# Patient Record
Sex: FEMALE | Race: WHITE
[De-identification: names, ages, dates, MRNs, and addresses within clinical notes are randomized per-mention and may not be internally consistent; named-entity substitution may affect disease eponyms.]

---

## 2018-04-25 ENCOUNTER — HOSPITAL ENCOUNTER (OUTPATIENT)
Dept: HOSPITAL 33 - ED | Age: 83
Setting detail: OBSERVATION
LOS: 2 days | Discharge: HOME | End: 2018-04-27
Attending: FAMILY MEDICINE | Admitting: FAMILY MEDICINE
Payer: MEDICARE

## 2018-04-25 DIAGNOSIS — F02.81: ICD-10-CM

## 2018-04-25 DIAGNOSIS — K21.9: ICD-10-CM

## 2018-04-25 DIAGNOSIS — Z79.899: ICD-10-CM

## 2018-04-25 DIAGNOSIS — Z85.3: ICD-10-CM

## 2018-04-25 DIAGNOSIS — M19.90: ICD-10-CM

## 2018-04-25 DIAGNOSIS — G30.9: ICD-10-CM

## 2018-04-25 DIAGNOSIS — I10: ICD-10-CM

## 2018-04-25 DIAGNOSIS — F41.9: ICD-10-CM

## 2018-04-25 DIAGNOSIS — J18.1: Primary | ICD-10-CM

## 2018-04-25 LAB
ALBUMIN SERPL-MCNC: 4.2 G/DL (ref 3.5–5)
ALP SERPL-CCNC: 114 U/L (ref 38–126)
ALT SERPL-CCNC: 12 U/L (ref 0–35)
ANION GAP SERPL CALC-SCNC: 17.6 MEQ/L (ref 5–15)
AST SERPL QL: 22 U/L (ref 14–36)
BASOPHILS # BLD AUTO: 0.02 10*3/UL (ref 0–0.4)
BASOPHILS NFR BLD AUTO: 0.2 % (ref 0–0.4)
BILIRUB BLD-MCNC: 0.5 MG/DL (ref 0.2–1.3)
BUN SERPL-MCNC: 23 MG/DL (ref 7–17)
CALCIUM SPEC-MCNC: 9.4 MG/DL (ref 8.4–10.2)
CHLORIDE SERPL-SCNC: 101 MMOL/L (ref 98–107)
CO2 SERPL-SCNC: 21 MMOL/L (ref 22–30)
CREAT SERPL-MCNC: 1.14 MG/DL (ref 0.52–1.04)
EOSINOPHIL # BLD AUTO: 0 10*3/UL (ref 0–0.5)
FLUAV AG NPH QL IA: NEGATIVE
FLUBV AG NPH QL IA: NEGATIVE
GLUCOSE SERPL-MCNC: 142 MG/DL (ref 74–106)
GLUCOSE UR-MCNC: 50 MG/DL
GRANULOCYTES # BLD AUTO: 10.55 10*3/UL (ref 1.4–6.9)
HCT VFR BLD AUTO: 41.2 % (ref 35–47)
HGB BLD-MCNC: 13.6 GM/DL (ref 12–16)
LYMPHOCYTES # SPEC AUTO: 0.91 10*3/UL (ref 1–4.6)
MCH RBC QN AUTO: 29.1 PG (ref 26–32)
MCHC RBC AUTO-ENTMCNC: 33 G/DL (ref 32–36)
MONOCYTES # BLD AUTO: 0.67 10*3/UL (ref 0–1.3)
NEUTROPHILS NFR BLD AUTO: 86.8 % (ref 36–66)
PLATELET # BLD AUTO: 308 K/MM3 (ref 150–450)
POTASSIUM SERPLBLD-SCNC: 4.2 MMOL/L (ref 3.5–5.1)
PROT SERPL-MCNC: 7.6 G/DL (ref 6.3–8.2)
PROT UR STRIP-MCNC: (no result) MG/DL
RBC # BLD AUTO: 4.67 M/MM3 (ref 4.1–5.4)
RBC # URNS HPF: (no result) /HPF (ref 0–2)
RSV AG SPEC QL IA: NEGATIVE
SODIUM SERPL-SCNC: 135 MMOL/L (ref 137–145)
WBC # BLD AUTO: 12.2 K/MM3 (ref 4–10.5)
WBC URNS QL MICRO: (no result) /HPF (ref 0–5)

## 2018-04-25 PROCEDURE — 96360 HYDRATION IV INFUSION INIT: CPT

## 2018-04-25 PROCEDURE — 80048 BASIC METABOLIC PNL TOTAL CA: CPT

## 2018-04-25 PROCEDURE — 96361 HYDRATE IV INFUSION ADD-ON: CPT

## 2018-04-25 PROCEDURE — 96365 THER/PROPH/DIAG IV INF INIT: CPT

## 2018-04-25 PROCEDURE — 93005 ELECTROCARDIOGRAM TRACING: CPT

## 2018-04-25 PROCEDURE — 81000 URINALYSIS NONAUTO W/SCOPE: CPT

## 2018-04-25 PROCEDURE — 36000 PLACE NEEDLE IN VEIN: CPT

## 2018-04-25 PROCEDURE — 87631 RESP VIRUS 3-5 TARGETS: CPT

## 2018-04-25 PROCEDURE — 83605 ASSAY OF LACTIC ACID: CPT

## 2018-04-25 PROCEDURE — 36415 COLL VENOUS BLD VENIPUNCTURE: CPT

## 2018-04-25 PROCEDURE — 80053 COMPREHEN METABOLIC PANEL: CPT

## 2018-04-25 PROCEDURE — 85025 COMPLETE CBC W/AUTO DIFF WBC: CPT

## 2018-04-25 PROCEDURE — 99285 EMERGENCY DEPT VISIT HI MDM: CPT

## 2018-04-25 PROCEDURE — 71046 X-RAY EXAM CHEST 2 VIEWS: CPT

## 2018-04-25 PROCEDURE — G0378 HOSPITAL OBSERVATION PER HR: HCPCS

## 2018-04-25 PROCEDURE — 87086 URINE CULTURE/COLONY COUNT: CPT

## 2018-04-25 PROCEDURE — 96367 TX/PROPH/DG ADDL SEQ IV INF: CPT

## 2018-04-25 PROCEDURE — 87040 BLOOD CULTURE FOR BACTERIA: CPT

## 2018-04-25 PROCEDURE — 87430 STREP A AG IA: CPT

## 2018-04-25 PROCEDURE — 93268 ECG RECORD/REVIEW: CPT

## 2018-04-25 PROCEDURE — 87070 CULTURE OTHR SPECIMN AEROBIC: CPT

## 2018-04-25 PROCEDURE — 94760 N-INVAS EAR/PLS OXIMETRY 1: CPT

## 2018-04-25 PROCEDURE — 93041 RHYTHM ECG TRACING: CPT

## 2018-04-25 RX ADMIN — ZOLPIDEM TARTRATE SCH MG: 5 TABLET ORAL at 22:11

## 2018-04-25 NOTE — ERPHSYRPT
- History of Present Illness


Time Seen by Provider: 18 17:21


Source: family


Exam Limitations: other (dementia)


Patient Subjective Stated Complaint: Pt family states "She has been fighting a 

cold for the past two days and today she had a fever.  Her temp was 103."


Triage Nursing Assessment: Pt alert to her norm.  Family states her dementia is 

getting worse and worse.  No apparent respiratory distress, able to ambulate 

with assistance.  Pt warm to the touch.


Physician History: 





According to her son, she has been coughing for about a week. She developed 

fever this afternoon, she was not given any Tylenol. Patient has Dementia, 

unable to offer any history, but not lethargic, no sign of difficulty breathing

, she is not distressed or lethargic, denies any complaints. Son denies vomiting

, diarrhea, rashes, other complaints, she was eating today.


Timing/Duration: week(s) (1)


Fever Severity: moderate


Associated Symptoms: cough


International travel in last 2 weeks: No


Allergies/Adverse Reactions: 








No Known Drug Allergies Allergy (Unverified 03/10/16 14:20)


 





Home Medications: 








Gabapentin [Neurontin] 400 mg PO TID 02/16/15 [History]


Lisinopril 5 mg*** [Zestril 5 MG***] 5 mg PO DAILY 02/16/15 [History]


Amitriptyline HCl 25 mg*** [Elavil 25 mg***] 50 mg PO DAILY 03/10/16 [History]





Hx Tetanus, Diphtheria Vaccination/Date Given: No


Hx Influenza Vaccination/Date Given: Yes


Hx Pneumococcal Vaccination/Date Given: Yes


Immunizations Up to Date: Yes





- Review of Systems


Constitutional: Fever


All Other Systems: Unable due to dementia





- Past Medical History


Pertinent Past Medical History: Yes


Neurological History: Dementia


ENT History: No Pertinent History


Cardiac History: Hypertension


Respiratory History: No Pertinent History


Endocrine Medical History: No Pertinent History


Musculoskeletal History: Arthritis


GI Medical History: GERD


 History: No Pertinent History


Psycho-Social History: Anxiety


Female Reproductive Disorders: No Pertinent History


Other Medical History: BREAST CA





- Past Surgical History


Past Surgical History: Yes


Neuro Surgical History: No Pertinent History


Cardiac: No Pertinent History


Respiratory: No Pertinent History


Gastrointestinal: No Pertinent History


Genitourinary: No Pertinent History


Musculoskeletal: Joint Replacement, Orthopedic Surgery


Female Surgical History:  Section, Lumpectomy


Other Surgical History: LT SHOULDER.  LEFT KNEE





- Social History


Smoking Status: Never smoker


Exposure to second hand smoke: Yes


Drug Use: none


Patient Lives Alone: No





- Nursing Vital Signs


Nursing Vital Signs: 


 Initial Vital Signs











Pulse Rate  108 H  18 17:13


 


Respiratory Rate  18   18 17:13


 


Blood Pressure  153/73   18 17:13


 


O2 Sat by Pulse Oximetry  97   18 17:13








 Pain Scale











Pain Intensity                 0

















- Physical Exam


General Appearance: no apparent distress


Eye Exam: eyes nml inspection


ENT Exam: normal ENT inspection, TMs normal, pharynx normal


Neck Exam: normal inspection, non-tender, supple, trachea midline, No JVD


Respiratory Exam: normal breath sounds, chest non-tender, lungs clear, no 

respiratory distress, no accessory muscle use


Cardiovascular/Chest Exam: normal heart sounds, regular rate/rhythm, normal 

peripheral pulses, No murmur, No edema, No JVD


Gastrointestinal/Abdominal Exam: soft, no distention, no mass, no guarding, no 

ecchymosis, no organomegaly, no pulsatile mass, normal bowel sounds, tenderness 

(mild, suprapubic), No distended


Extremity Exam: non-tender, no calf tenderness, no pedal edema


Neurologic Exam: alert, cooperative, normal mood/affect


Skin Exam: normal color, warm, dry, No rash


Lymphatic: No adenopathy


**SpO2 Interpretation**: normal


SpO2: 97


Oxygen Delivery: Room Air





- Course


Nursing assessment & vital signs reviewed: Yes


EKG Interpreted by Me: RATE (95/min), NORMAL AXIS, NORMAL INTERVALS, NORMAL ST-T





- Radiology Exams


  ** Chest


X-ray Interpretation: Interpreted by me, Negative


Ordered Tests: 


 Active Orders 24 hr











 Category Date Time Status


 


 Cardiac Monitor STAT Care  18 17:27 Active


 


 Cath for Specimen-Straight STAT Care  18 17:27 Active


 


 EKG-ER Only STAT Care  18 17:26 Active


 


 IV Insertion STAT Care  18 17:26 Active


 


 CHEST 2 VIEWS (PA AND LAT) Stat Exams  18 17:27 Taken


 


 BLOOD CULTURE Stat Lab  18 17:46 Received


 


 CBC W DIFF Stat Lab  18 17:46 Completed


 


 CMP Stat Lab  18 17:46 Completed


 


 CULTURE, THROAT Stat Lab  18 17:46 Received


 


 CULTURE,URINE Stat Lab  18 17:55 Received


 


 Lactic Acid Stat Lab  18 18:45 Completed


 


 STREP SCREEN-BETA A Stat Lab  18 17:46 Completed


 


 UA W/ MICROSCOPIC Stat Lab  18 17:55 Completed








Medication Summary











Generic Name Dose Route Start Last Admin





  Trade Name Luis Eduardo  PRN Reason Stop Dose Admin


 


Sodium Chloride  1,000 mls @ 100 mls/hr  18 17:30  18 17:31





  Sodium Chloride 0.9% 1000 Ml  IV  18 17:29  100 mls/hr





  .Q10H RAMA   Administration














Discontinued Medications














Generic Name Dose Route Start Last Admin





  Trade Name Pedritoq  PRN Reason Stop Dose Admin


 


Acetaminophen  650 mg  18 17:25  18 17:29





  Tylenol 325 Mg***  PO  18 17:26  650 mg





  STAT STA   Administration


 


Acetaminophen  Confirm  18 17:27  





  Tylenol 325 Mg***  Administered  18 17:28  





  Dose   





  650 mg   





  .ROUTE   





  .[employer]-MED ONE   











Lab/Rad Data: 


 Laboratory Result Diagrams





 18 17:46 





 18 17:46 





 Laboratory Results











  18 Range/Units





  18:45 17:55 17:46 


 


WBC     (4.0-10.5)  K/mm3


 


RBC     (4.1-5.4)  M/mm3


 


Hgb     (12.0-16.0)  gm/dl


 


Hct     (35-47)  %


 


MCV     ()  fl


 


MCH     (26-32)  pg


 


MCHC     (32-36)  g/dl


 


RDW     (11.5-14.0)  %


 


Plt Count     (150-450)  K/mm3


 


MPV     (6-9.5)  fl


 


Gran %     (36.0-66.0)  %


 


Eos # (Auto)     (0-0.5)  


 


Absolute Lymphs (auto)     (1.0-4.6)  


 


Absolute Monos (auto)     (0.0-1.3)  


 


Lymphocytes %     (24.0-44.0)  %


 


Monocytes %     (0.0-12.0)  %


 


Eosinophils %     (0.00-5.0)  %


 


Basophils %     (0.0-0.4)  %


 


Absolute Granulocytes     (1.4-6.9)  


 


Basophils #     (0-0.4)  


 


Sodium     (137-145)  mmol/L


 


Potassium     (3.5-5.1)  mmol/L


 


Chloride     ()  mmol/L


 


Carbon Dioxide     (22-30)  mmol/L


 


Anion Gap     (5-15)  MEQ/L


 


BUN     (7-17)  mg/dL


 


Creatinine     (0.52-1.04)  mg/dL


 


Estimated GFR     ML/MIN


 


Glucose     ()  mg/dL


 


Lactic Acid  1.2    (0.4-2.0)  


 


Calcium     (8.4-10.2)  mg/dL


 


Total Bilirubin     (0.2-1.3)  mg/dL


 


AST     (14-36)  U/L


 


ALT     (0-35)  U/L


 


Alkaline Phosphatase     ()  U/L


 


Serum Total Protein     (6.3-8.2)  g/dL


 


Albumin     (3.5-5.0)  g/dL


 


Ur Collection Type   CATH   


 


Urine Color   YELLOW   (YELLOW)  


 


Urine Appearance   CLEAR   (CLEAR)  


 


Urine pH   5.0   (5-6)  


 


Ur Specific Gravity   1.020   (1.005-1.025)  


 


Urine Protein   TRACE   (Negative)  


 


Urine Ketones   SMALL   (NEGATIVE)  


 


Urine Blood   250   (0-5)  Michael/ul


 


Urine Nitrite   NEGATIVE   (NEGATIVE)  


 


Urine Bilirubin   NEGATIVE   (NEGATIVE)  


 


Urine Urobilinogen   NORMAL   (0-1)  mg/dL


 


Ur Leukocyte Esterase   NEGATIVE   (NEGATIVE)  


 


Urine Microscopic RBC   2-5   (0-2)  /HPF


 


Urine Microscopic WBC   0-2   (0-5)  /HPF


 


Ur Epithelial Cells   FEW   (FEW)  /HPF


 


Urine Bacteria   FEW   (NEGATIVE)  /HPF


 


Urine Mucus   MODERATE   (NEGATIVE)  /HPF


 


Urine Culture Reflexed   YES   (NO)  


 


Urine Glucose   50   (NEGATIVE)  mg/dL


 


Influenza Type A Ag    NEGATIVE  (NEGATIVE)  


 


Influenza Type B Ag    NEGATIVE  (NEGATIVE)  


 


RSV (PCR)    NEGATIVE  (Negative)  


 


Streptococcus Screen     (Negative)  


 


Specimen Received   18 [id number]   














  18 Range/Units





  17:46 17:46 17:46 


 


WBC    12.2 H  (4.0-10.5)  K/mm3


 


RBC    4.67  (4.1-5.4)  M/mm3


 


Hgb    13.6  (12.0-16.0)  gm/dl


 


Hct    41.2  (35-47)  %


 


MCV    88.2  ()  fl


 


MCH    29.1  (26-32)  pg


 


MCHC    33.0  (32-36)  g/dl


 


RDW    14.5 H  (11.5-14.0)  %


 


Plt Count    308  (150-450)  K/mm3


 


MPV    9.7 H  (6-9.5)  fl


 


Gran %    86.8 H  (36.0-66.0)  %


 


Eos # (Auto)    0  (0-0.5)  


 


Absolute Lymphs (auto)    0.91 L  (1.0-4.6)  


 


Absolute Monos (auto)    0.67  (0.0-1.3)  


 


Lymphocytes %    7.5 L  (24.0-44.0)  %


 


Monocytes %    5.5  (0.0-12.0)  %


 


Eosinophils %    0.0  (0.00-5.0)  %


 


Basophils %    0.2  (0.0-0.4)  %


 


Absolute Granulocytes    10.55 H  (1.4-6.9)  


 


Basophils #    0.02  (0-0.4)  


 


Sodium   135 L   (137-145)  mmol/L


 


Potassium   4.2   (3.5-5.1)  mmol/L


 


Chloride   101   ()  mmol/L


 


Carbon Dioxide   21 L   (22-30)  mmol/L


 


Anion Gap   17.6 H   (5-15)  MEQ/L


 


BUN   23 H   (7-17)  mg/dL


 


Creatinine   1.14 H   (0.52-1.04)  mg/dL


 


Estimated GFR   48.1   ML/MIN


 


Glucose   142 H   ()  mg/dL


 


Lactic Acid     (0.4-2.0)  


 


Calcium   9.4   (8.4-10.2)  mg/dL


 


Total Bilirubin   0.50   (0.2-1.3)  mg/dL


 


AST   22   (14-36)  U/L


 


ALT   12   (0-35)  U/L


 


Alkaline Phosphatase   114   ()  U/L


 


Serum Total Protein   7.6   (6.3-8.2)  g/dL


 


Albumin   4.2   (3.5-5.0)  g/dL


 


Ur Collection Type     


 


Urine Color     (YELLOW)  


 


Urine Appearance     (CLEAR)  


 


Urine pH     (5-6)  


 


Ur Specific Gravity     (1.005-1.025)  


 


Urine Protein     (Negative)  


 


Urine Ketones     (NEGATIVE)  


 


Urine Blood     (0-5)  Michael/ul


 


Urine Nitrite     (NEGATIVE)  


 


Urine Bilirubin     (NEGATIVE)  


 


Urine Urobilinogen     (0-1)  mg/dL


 


Ur Leukocyte Esterase     (NEGATIVE)  


 


Urine Microscopic RBC     (0-2)  /HPF


 


Urine Microscopic WBC     (0-5)  /HPF


 


Ur Epithelial Cells     (FEW)  /HPF


 


Urine Bacteria     (NEGATIVE)  /HPF


 


Urine Mucus     (NEGATIVE)  /HPF


 


Urine Culture Reflexed     (NO)  


 


Urine Glucose     (NEGATIVE)  mg/dL


 


Influenza Type A Ag     (NEGATIVE)  


 


Influenza Type B Ag     (NEGATIVE)  


 


RSV (PCR)     (Negative)  


 


Streptococcus Screen  NEGATIVE    (Negative)  


 


Specimen Received     














- Progress


Progress: improved


Progress Note: 





18 19:13


Pt became afebrile, she has been stable, no sign of difficulty breathing, I 

called Dr Rojas, covering Dr Nicolas, discussed our results and patient's 

current condition, he agred to admit her to Medical bed for further treatment. 

I informed her family, they agreed.


Discussed with : Bob


Will see patient in: hospital (observation)


Counseled pt/family regarding: lab results, diagnosis, rad results





- Departure


Time of Disposition: 19:15


Departure Disposition: Observation


Clinical Impression: 


Fever


Qualifiers:


 Fever type: unspecified Qualified Code(s): R50.9 - Fever, unspecified





Condition: Stable


Critical Care Time: No


Referrals: 


MARISOL NICOLAS [Primary Care Provider] -

## 2018-04-26 LAB
ANION GAP SERPL CALC-SCNC: 13.3 MEQ/L (ref 5–15)
BASOPHILS # BLD AUTO: 0.02 10*3/UL (ref 0–0.4)
BASOPHILS NFR BLD AUTO: 0.2 % (ref 0–0.4)
BUN SERPL-MCNC: 23 MG/DL (ref 7–17)
CALCIUM SPEC-MCNC: 8.6 MG/DL (ref 8.4–10.2)
CHLORIDE SERPL-SCNC: 106 MMOL/L (ref 98–107)
CO2 SERPL-SCNC: 23 MMOL/L (ref 22–30)
CREAT SERPL-MCNC: 1.19 MG/DL (ref 0.52–1.04)
EOSINOPHIL # BLD AUTO: 0.03 10*3/UL (ref 0–0.5)
GLUCOSE SERPL-MCNC: 99 MG/DL (ref 74–106)
GRANULOCYTES # BLD AUTO: 7.83 10*3/UL (ref 1.4–6.9)
HCT VFR BLD AUTO: 36.2 % (ref 35–47)
HGB BLD-MCNC: 11.9 GM/DL (ref 12–16)
LYMPHOCYTES # SPEC AUTO: 1.19 10*3/UL (ref 1–4.6)
MCH RBC QN AUTO: 29.4 PG (ref 26–32)
MCHC RBC AUTO-ENTMCNC: 32.9 G/DL (ref 32–36)
MONOCYTES # BLD AUTO: 0.63 10*3/UL (ref 0–1.3)
NEUTROPHILS NFR BLD AUTO: 80.7 % (ref 36–66)
PLATELET # BLD AUTO: 265 K/MM3 (ref 150–450)
POTASSIUM SERPLBLD-SCNC: 3.8 MMOL/L (ref 3.5–5.1)
RBC # BLD AUTO: 4.04 M/MM3 (ref 4.1–5.4)
SODIUM SERPL-SCNC: 138 MMOL/L (ref 137–145)
WBC # BLD AUTO: 9.7 K/MM3 (ref 4–10.5)

## 2018-04-26 RX ADMIN — GABAPENTIN SCH MG: 400 CAPSULE ORAL at 14:36

## 2018-04-26 RX ADMIN — GABAPENTIN SCH MG: 400 CAPSULE ORAL at 21:40

## 2018-04-26 RX ADMIN — ZOLPIDEM TARTRATE SCH MG: 5 TABLET ORAL at 21:40

## 2018-04-26 RX ADMIN — LISINOPRIL SCH MG: 5 TABLET ORAL at 10:35

## 2018-04-26 RX ADMIN — ENOXAPARIN SODIUM SCH MG: 100 INJECTION SUBCUTANEOUS at 11:25

## 2018-04-26 RX ADMIN — GABAPENTIN SCH MG: 400 CAPSULE ORAL at 10:35

## 2018-04-26 NOTE — XRAY
Indication: Fever and cough.



Comparison: March 10, 2016.



PA/lateral chest hyperinflated and clear.  Heart is not enlarged.  Vascularity

normal.  Stable small hiatal hernia.  Bony thorax intact again with

osteopenia, degenerative changes, scoliosis, and remote L1 compression

deformity.



Impression: Nonacute hyperinflated chest with chronic features.

## 2018-04-26 NOTE — PCM.HP
History of Present Illness





- Chief Complaint


Chief Complaint: Fever


History of Present Illness: 


 is a 85 year old female with dementia who was admitted through ER 

last night with fever to 103 and cough.  She was started on rocehpin and 

zithromax; presumtive tx for pna even though CXR is read as negative.  UA with 0

-2 WBC, 2-5 RBC.  UCx, throat culture, and Bld Cx pending.





This morning she is somewhat argumentative, would like to go home because her 

 is sick (he does have prostate ca).  Says she is feeling fine.





- Review of Systems


Constitutional: Fever


Respiratory: Cough


All Other Systems: Unable due to dementia





Medications & Allergies


Home Medications: 


 Home Medication List





Gabapentin [Neurontin] 400 mg PO TID 02/16/15 [History Confirmed 18]


Lisinopril 5 mg*** [Zestril 5 MG***] 5 mg PO DAILY 02/16/15 [History Confirmed 

18]


Amitriptyline HCl 25 mg*** [Elavil 25 mg***] 50 mg PO DAILY 03/10/16 [History 

Confirmed 18]








Allergies/Adverse Reactions: 


 Allergies











Allergy/AdvReac Type Severity Reaction Status Date / Time


 


No Known Drug Allergies Allergy   Unverified 03/10/16 14:20














- Past Medical History


Past Medical History: Yes


Neurological History: Dementia


ENT History: No Pertinent History


Cardiac History: Hypertension


Respiratory History: No Pertinent History


Endocrine Medical History: No Pertinent History


Musculoskelatal History: Arthritis


GI Medical History: GERD


 History: No Pertinent History


Pyscho-Social History: Anxiety


Reproductive Disorders: No Pertinent History


Comment: BREAST CA





- Past Surgical History


Past Surgical History: Yes


Neuro Surgical History: No Pertinent History


Cardiac History: No Pertinent History


Respiratory Surgery: No Pertinent History


GI Surgical History: No Pertinent History


Genitourinary Surgical Hx: No Pertinent History


Musculskeletal Surgical Hx: Joint Replacement, Orthopedic Surgery


Female Surgical History:  Section, Lumpectomy


Other Surgical History: LT SHOULDER.  LEFT KNEE





- Social History


Smoking Status: Never smoker


Exposure to second hand smoke: Yes


Alcohol: Rarely


Drug Use: none





- Physical Exam


Vital Signs: 


 Vital Signs - 24 hr











  Temp Pulse Resp BP Pulse Ox


 


 18 07:13  98.8 F  73  20  147/65  94 L


 


 04/26/18 06:34      98


 


 18 04:00  98.9 F  68  18  137/65  95


 


 18 00:00  98.1 F  77  18  152/73  94 L


 


 18 23:31      97


 


 18 21:35      96


 


 18 20:00  98.2 F  83  18  192/81  96


 


 18 19:15      97


 


 18 18:56  98.7 F  93 H  20   98


 


 18 18:03   96 H  20  153/73  94 L


 


 18 17:13   108 H  18  153/73  97








 Oxygen-Last 24 hours











O2 Percentage                  4 Liters = 36%














General Appearance: no apparent distress, anxiety


Neurologic Exam: alert, disoriented


Eye Exam: eyes nml inspection


Ears, Nose, Throat Exam: moist mucous membranes


Neck Exam: normal inspection


Respiratory Exam: normal breath sounds, rhonchi (RLL), No crackles/rales, No 

wheezing


Cardiovascular Exam: regular rate/rhythm, normal heart sounds, No murmur


Gastrointestinal/Abdomen Exam: No distention


Back Exam: normal inspection, No rash


Extremity Exam: normal inspection, No pedal edema, No swelling


Skin Exam: normal color, warm, dry, No rash





Results





- Labs


Lab/Micro Results: 


 Lab Results-Last 24 Hours











  18 Range/Units





  05:35 05:35 


 


WBC  9.7   (4.0-10.5)  K/mm3


 


RBC  4.04 L   (4.1-5.4)  M/mm3


 


Hgb  11.9 L   (12.0-16.0)  gm/dl


 


Hct  36.2   (35-47)  %


 


MCV  89.6   ()  fl


 


MCH  29.4   (26-32)  pg


 


MCHC  32.9   (32-36)  g/dl


 


RDW  14.6 H   (11.5-14.0)  %


 


Plt Count  265   (150-450)  K/mm3


 


MPV  9.4   (6-9.5)  fl


 


Gran %  80.7 H   (36.0-66.0)  %


 


Eos # (Auto)  0.03   (0-0.5)  


 


Absolute Lymphs (auto)  1.19   (1.0-4.6)  


 


Absolute Monos (auto)  0.63   (0.0-1.3)  


 


Lymphocytes %  12.3 L   (24.0-44.0)  %


 


Monocytes %  6.5   (0.0-12.0)  %


 


Eosinophils %  0.3   (0.00-5.0)  %


 


Basophils %  0.2   (0.0-0.4)  %


 


Absolute Granulocytes  7.83 H   (1.4-6.9)  


 


Basophils #  0.02   (0-0.4)  


 


Sodium   138  (137-145)  mmol/L


 


Potassium   3.8  (3.5-5.1)  mmol/L


 


Chloride   106  ()  mmol/L


 


Carbon Dioxide   23  (22-30)  mmol/L


 


Anion Gap   13.3  (5-15)  MEQ/L


 


BUN   23 H  (7-17)  mg/dL


 


Creatinine   1.19 H  (0.52-1.04)  mg/dL


 


Estimated GFR   45.8  ML/MIN


 


Glucose   99  ()  mg/dL


 


Calcium   8.6  (8.4-10.2)  mg/dL














Assessment/Plan


(1) Pneumonia


Current Visit: Yes   Status: Acute   


Qualifiers: 


   Pneumonia type: due to unspecified organism   Laterality: right   Lung 

location: lower lobe of lung   Qualified Code(s): J18.1 - Lobar pneumonia, 

unspecified organism   


Assessment & Plan: 


Clinically, rhonchi on right. With sx of cough and fever to 103, presume 

pneumonia despite clear CXR.  On rocephin and zithromax day #2.  She will need 

at least another day or two on the IV antibiotics before discharge to home. 


Code(s): J18.9 - PNEUMONIA, UNSPECIFIED ORGANISM   





(2) Fever


Current Visit: Yes   Status: Acute   Onset Date: ~18   


Qualifiers: 


   Fever type: unspecified   Qualified Code(s): R50.9 - Fever, unspecified   


Code(s): R50.9 - FEVER, UNSPECIFIED   





(3) Dementia


Current Visit: Yes   Status: Chronic   


Qualifiers: 


   Dementia type: Alzheimer's disease   Alzheimer's disease onset: unspecified 

onset   Dementia behavioral disturbance: with behavioral disturbance   

Qualified Code(s): G30.9 - Alzheimer's disease, unspecified; F02.81 - Dementia 

in other diseases classified elsewhere with behavioral disturbance; F02.81 - 

Dementia in other diseases classified elsewhere with behavioral disturbance; 

F02.81 - Dementia in other diseases classified elsewhere with behavioral 

disturbance   


Code(s): F03.90 - UNSPECIFIED DEMENTIA WITHOUT BEHAVIORAL DISTURBANCE

## 2018-04-27 VITALS — OXYGEN SATURATION: 94 %

## 2018-04-27 VITALS — HEART RATE: 79 BPM | SYSTOLIC BLOOD PRESSURE: 144 MMHG | DIASTOLIC BLOOD PRESSURE: 72 MMHG

## 2018-04-27 RX ADMIN — LISINOPRIL SCH MG: 5 TABLET ORAL at 08:56

## 2018-04-27 RX ADMIN — GABAPENTIN SCH MG: 400 CAPSULE ORAL at 08:56

## 2018-04-27 RX ADMIN — ENOXAPARIN SODIUM SCH: 100 INJECTION SUBCUTANEOUS at 08:56

## 2018-04-27 NOTE — PCM.DS
Discharge Summary


Date of Admission: 


04/25/18 19:59





Admitting Physician: 


MARISOL BLACKWOOD





Primary Care Provider: 


MARISOL BLACKWOOD








Allergies


Allergies





No Known Drug Allergies Allergy (Unverified 03/10/16 14:20)


 











Hospital Summary





- Hospital Course


Hospital Course: 





She is feeling good, asking again to go home.  Has been trying to "call her 

family" but RN is unsure who she's calling.





She is up fixing her hair in front of the sink. 





- Vitals & Intake/Output


Vital Signs: 





 Vital Signs











Temperature  97.4 F   04/27/18 08:00


 


Pulse Rate  79   04/27/18 08:00


 


Respiratory Rate  24   04/27/18 08:00


 


Blood Pressure  144/72   04/27/18 08:00


 


O2 Sat by Pulse Oximetry  94 L  04/27/18 08:00








 Oxygen-Last Documented











O2 Percentage                  4 Liters = 36%














Intake & Output: 





 Intake & Output











 04/24/18 04/25/18 04/26/18 04/27/18





 11:59 11:59 11:59 11:59


 


Intake Total   1349 4412


 


Output Total   1100 1600


 


Balance   249 2812


 


Weight   58.5 kg 














- Lab


Result Diagrams: 


 04/26/18 05:35





 04/26/18 05:35





Discharge Exam


General Appearance: no apparent distress, alert


Neurologic Exam: cooperative, disoriented


Skin Exam: normal color, warm, dry, No rash


Respiratory Exam: normal breath sounds, lungs clear, No crackles/rales, No 

rhonchi, No wheezing


Cardiovascular Exam: regular rate/rhythm, normal heart sounds, No murmur


Extremity Exam: normal inspection, No pedal edema, No swelling


Back Exam: normal inspection, No rash





Final Diagnosis/Problem List





- Final Discharge Diagnosis/Problem


(1) Pneumonia


Current Visit: Yes   Status: Acute   


Assessment & Plan: 


Doing great, on zithromax and rocephin day #3.  Home on augmentin. 








(2) Fever


Current Visit: Yes   Status: Resolved   Onset Date: ~04/26/18   





(3) Dementia


Current Visit: Yes   Status: Chronic   





- Discharge


Disposition: Home, Self-Care


Condition: Stable


Prescriptions: 


New


   Amoxicillin/Potassium Clav [Augmentin 875-125 Tablet] 875 mg PO BID #14 

tablet





Continue


   Lisinopril 5 mg*** [Zestril 5 MG***] 5 mg PO DAILY


   Gabapentin [Neurontin] 400 mg PO TID


   Amitriptyline HCl 25 mg*** [Elavil 25 mg***] 50 mg PO DAILY


   Rivastigmine Tartrate [Rivastigmine] 3 mg PO BID


Instructions:  Fever of Unknown Origin


Follow up with: 


MARISOL BLACKWOOD [Primary Care Provider] - 05/04/18 10:15 am


Forms:  Discharge Instructions, Patient Portal Information

## 2019-04-26 ENCOUNTER — HOSPITAL ENCOUNTER (EMERGENCY)
Dept: HOSPITAL 33 - ED | Age: 84
Discharge: HOME | End: 2019-04-26
Payer: MEDICARE

## 2019-04-26 VITALS — SYSTOLIC BLOOD PRESSURE: 168 MMHG | HEART RATE: 74 BPM | OXYGEN SATURATION: 97 % | DIASTOLIC BLOOD PRESSURE: 78 MMHG

## 2019-04-26 DIAGNOSIS — G25.79: Primary | ICD-10-CM

## 2019-04-26 DIAGNOSIS — F03.90: ICD-10-CM

## 2019-04-26 DIAGNOSIS — Z85.3: ICD-10-CM

## 2019-04-26 DIAGNOSIS — R41.0: ICD-10-CM

## 2019-04-26 DIAGNOSIS — K21.9: ICD-10-CM

## 2019-04-26 DIAGNOSIS — I10: ICD-10-CM

## 2019-04-26 DIAGNOSIS — R29.6: ICD-10-CM

## 2019-04-26 LAB
ALBUMIN SERPL-MCNC: 3.9 G/DL (ref 3.5–5)
ALP SERPL-CCNC: 120 U/L (ref 38–126)
ALT SERPL-CCNC: 17 U/L (ref 0–35)
ANION GAP SERPL CALC-SCNC: 14.7 MEQ/L (ref 5–15)
AST SERPL QL: 39 U/L (ref 14–36)
BASOPHILS # BLD AUTO: 0.03 10*3/UL (ref 0–0.4)
BASOPHILS NFR BLD AUTO: 0.7 % (ref 0–0.4)
BILIRUB BLD-MCNC: 0.6 MG/DL (ref 0.2–1.3)
BUN SERPL-MCNC: 17 MG/DL (ref 7–17)
CALCIUM SPEC-MCNC: 9.2 MG/DL (ref 8.4–10.2)
CHLORIDE SERPL-SCNC: 104 MMOL/L (ref 98–107)
CO2 SERPL-SCNC: 26 MMOL/L (ref 22–30)
CREAT SERPL-MCNC: 1.05 MG/DL (ref 0.52–1.04)
EOSINOPHIL # BLD AUTO: 0.07 10*3/UL (ref 0–0.5)
GLUCOSE SERPL-MCNC: 83 MG/DL (ref 74–106)
GLUCOSE UR-MCNC: NEGATIVE MG/DL
GRANULOCYTES # BLD AUTO: 2.9 10*3/UL (ref 1.4–6.9)
HCT VFR BLD AUTO: 38.5 % (ref 35–47)
HGB BLD-MCNC: 12.2 GM/DL (ref 12–16)
LYMPHOCYTES # SPEC AUTO: 0.95 10*3/UL (ref 1–4.6)
MAGNESIUM SERPL-MCNC: 2.5 MG/DL (ref 1.6–2.3)
MCH RBC QN AUTO: 29.3 PG (ref 26–32)
MCHC RBC AUTO-ENTMCNC: 31.7 G/DL (ref 32–36)
MONOCYTES # BLD AUTO: 0.48 10*3/UL (ref 0–1.3)
NEUTROPHILS NFR BLD AUTO: 65.5 % (ref 36–66)
PLATELET # BLD AUTO: 317 K/MM3 (ref 150–450)
POTASSIUM SERPLBLD-SCNC: 4.5 MMOL/L (ref 3.5–5.1)
PROT SERPL-MCNC: 7.5 G/DL (ref 6.3–8.2)
PROT UR STRIP-MCNC: NEGATIVE MG/DL
RBC # BLD AUTO: 4.17 M/MM3 (ref 4.1–5.4)
SODIUM SERPL-SCNC: 140 MMOL/L (ref 137–145)
WBC # BLD AUTO: 4.4 K/MM3 (ref 4–10.5)
WBC #/AREA URNS HPF: (no result) /HPF (ref 0–5)

## 2019-04-26 PROCEDURE — 36000 PLACE NEEDLE IN VEIN: CPT

## 2019-04-26 PROCEDURE — 80053 COMPREHEN METABOLIC PANEL: CPT

## 2019-04-26 PROCEDURE — 83735 ASSAY OF MAGNESIUM: CPT

## 2019-04-26 PROCEDURE — 85025 COMPLETE CBC W/AUTO DIFF WBC: CPT

## 2019-04-26 PROCEDURE — 96361 HYDRATE IV INFUSION ADD-ON: CPT

## 2019-04-26 PROCEDURE — 99284 EMERGENCY DEPT VISIT MOD MDM: CPT

## 2019-04-26 PROCEDURE — 84443 ASSAY THYROID STIM HORMONE: CPT

## 2019-04-26 PROCEDURE — 36415 COLL VENOUS BLD VENIPUNCTURE: CPT

## 2019-04-26 PROCEDURE — 81001 URINALYSIS AUTO W/SCOPE: CPT

## 2019-04-26 PROCEDURE — 96360 HYDRATION IV INFUSION INIT: CPT

## 2019-04-26 NOTE — ERPHSYRPT
- History of Present Illness


Source: family


Exam Limitations: clinical condition


Patient Subjective Stated Complaint: fami;y staets she has been falling and 

unable to stand or walk for the past 24 hours.. has recently been treated for a 

UTI.. finished ATB on tuesday.  denies pain.. family concerned of decline.  

known dementia.. unaware of place and time


Triage Nursing Assessment: alert and confused.  voices no c/o.  family janeth 

has had a decline of ability to walk today.  has been being treated for UTI and 

finished last week.  weakness that has been progressive.


Physician History: 





Pt is an 85 y/o female that has a h/o dementia and is cared for at home .  

Pt had more falls recently and was confused, and she was brought to the ER for 

work up.  Pt can't give any ROS, or history, secondary to severe dementia.


Timing/Duration: day(s)


Severity: moderate


Allergies/Adverse Reactions: 








No Known Drug Allergies Allergy (Unverified 03/10/16 14:20)


 





Hx Tetanus, Diphtheria Vaccination/Date Given: No


Hx Influenza Vaccination/Date Given: Yes


Hx Pneumococcal Vaccination/Date Given: Yes





- Review of Systems


Constitutional: Other (Pt can't give any ROS, secondary to severe dementia.)





- Past Medical History


Pertinent Past Medical History: Yes


Neurological History: Dementia


ENT History: No Pertinent History


Cardiac History: Hypertension


Respiratory History: No Pertinent History


Endocrine Medical History: No Pertinent History


Musculoskeletal History: Arthritis


GI Medical History: GERD


 History: No Pertinent History


Psycho-Social History: Anxiety


Female Reproductive Disorders: No Pertinent History


Other Medical History: BREAST CA





- Past Surgical History


Past Surgical History: Yes


Neuro Surgical History: No Pertinent History


Cardiac: No Pertinent History


Respiratory: No Pertinent History


Gastrointestinal: No Pertinent History


Genitourinary: No Pertinent History


Musculoskeletal: Joint Replacement, Orthopedic Surgery


Female Surgical History:  Section, Lumpectomy


Other Surgical History: LT SHOULDER.  LEFT KNEE





- Social History


Smoking Status: Never smoker


Exposure to second hand smoke: No


Drug Use: none


Patient Lives Alone: No





- Female History


Hx Pregnant Now: No





- Nursing Vital Signs


Nursing Vital Signs: 





 Initial Vital Signs











Temperature  97.4 F   19 17:45


 


Pulse Rate  74   19 17:45


 


Respiratory Rate  18   19 17:45


 


Blood Pressure  157/99   19 17:45


 


O2 Sat by Pulse Oximetry  97   19 17:45








 Pain Scale











Pain Intensity                 0

















- Physical Exam


General Appearance: no apparent distress, alert


Eye Exam: PERRL/EOMI, eyes nml inspection


Ears, Nose, Throat Exam: normal ENT inspection, TMs normal, pharynx normal, 

moist mucous membranes


Neck Exam: normal inspection, non-tender, supple, full range of motion


Respiratory Exam: normal breath sounds, lungs clear, No respiratory distress


Cardiovascular Exam: regular rate/rhythm, normal heart sounds, normal 

peripheral pulses


Gastrointestinal/Abdomen Exam: soft, normal bowel sounds, No tenderness, No mass


Neurologic Exam: alert, normal mood/affect, nml cerebellar function, sensation 

nml, other (tremors of extremities.  Dementia)


SpO2: 92





- Course


Nursing assessment & vital signs reviewed: Yes


EKG Interpreted by Me: RATE (76bpm, PVC), Sinus Rhythm, NORMAL QRS


Ordered Tests: 





 Active Orders 24 hr











 Category Date Time Status


 


 CBC W DIFF Stat Lab  19 18:00 Completed


 


 CMP Stat Lab  19 18:00 Completed


 


 MAGNESIUM Stat Lab  19 18:00 Completed


 


 TSH [TSH, 3RD Generation] Stat Lab  19 18:00 Completed


 


 UA W/RFX UR CULTURE Stat Lab  19 17:17 Completed








Medication Summary











Generic Name Dose Route Start Last Admin





  Trade Name Freq  PRN Reason Stop Dose Admin


 


Sodium Chloride  1,000 mls @ 100 mls/hr  19 17:15  19 19:00





  Sodium Chloride 0.9% 1000 Ml  IV  19 17:14  100 mls/hr





  .Q10H RAMA   Administration





     





     





     





     











Lab/Rad Data: 





 Laboratory Result Diagrams





 19 18:00 





 19 18:00 





 Laboratory Results











  19 Range/Units





  18:00 18:00 18:00 


 


WBC    4.4  (4.0-10.5)  K/mm3


 


RBC    4.17  (4.1-5.4)  M/mm3


 


Hgb    12.2  (12.0-16.0)  gm/dl


 


Hct    38.5  (35-47)  %


 


MCV    92.3  ()  fl


 


MCH    29.3  (26-32)  pg


 


MCHC    31.7 L  (32-36)  g/dl


 


RDW    16.7 H  (11.5-14.0)  %


 


Plt Count    317  (150-450)  K/mm3


 


MPV    8.9  (6-9.5)  fl


 


Gran %    65.5  (36.0-66.0)  %


 


Eos # (Auto)    0.07  (0-0.5)  


 


Absolute Lymphs (auto)    0.95 L  (1.0-4.6)  


 


Absolute Monos (auto)    0.48  (0.0-1.3)  


 


Lymphocytes %    21.4 L  (24.0-44.0)  %


 


Monocytes %    10.8  (0.0-12.0)  %


 


Eosinophils %    1.6  (0.00-5.0)  %


 


Basophils %    0.7  (0.0-0.4)  %


 


Absolute Granulocytes    2.90  (1.4-6.9)  


 


Basophils #    0.03  (0-0.4)  


 


Sodium   140   (137-145)  mmol/L


 


Potassium   4.5   (3.5-5.1)  mmol/L


 


Chloride   104   ()  mmol/L


 


Carbon Dioxide   26   (22-30)  mmol/L


 


Anion Gap   14.7   (5-15)  MEQ/L


 


BUN   17   (7-17)  mg/dL


 


Creatinine   1.05 H   (0.52-1.04)  mg/dL


 


Estimated GFR   52.8   ML/MIN


 


Glucose   83   ()  mg/dL


 


Calcium   9.2   (8.4-10.2)  mg/dL


 


Magnesium   2.5 H   (1.6-2.3)  mg/dL


 


Total Bilirubin   0.60   (0.2-1.3)  mg/dL


 


AST   39 H   (14-36)  U/L


 


ALT   17   (0-35)  U/L


 


Alkaline Phosphatase   120   ()  U/L


 


Serum Total Protein   7.5   (6.3-8.2)  g/dL


 


Albumin   3.9   (3.5-5.0)  g/dL


 


TSH 3rd Generation  3.350    (0.47-4.68)  mIU/L


 


Urine Color     (YELLOW)  


 


Urine Appearance     (CLEAR)  


 


Urine pH     (5-6)  


 


Ur Specific Gravity     (1.005-1.025)  


 


Urine Protein     (Negative)  


 


Urine Ketones     (NEGATIVE)  


 


Urine Blood     (0-5)  Michael/ul


 


Urine Nitrite     (NEGATIVE)  


 


Urine Bilirubin     (NEGATIVE)  


 


Urine Urobilinogen     (0-1)  mg/dL


 


Ur Leukocyte Esterase     (NEGATIVE)  


 


Urine WBC (Auto)     (0-5)  /HPF


 


Urine RBC (Auto)     (0-2)  /HPF


 


U Epithel Cells (Auto)     (FEW)  /HPF


 


Urine Bacteria (Auto)     (NEGATIVE)  /HPF


 


Urine Culture Reflexed     (NO)  


 


Urine Glucose     (NEGATIVE)  mg/dL














  19 Range/Units





  17:17 


 


WBC   (4.0-10.5)  K/mm3


 


RBC   (4.1-5.4)  M/mm3


 


Hgb   (12.0-16.0)  gm/dl


 


Hct   (35-47)  %


 


MCV   ()  fl


 


MCH   (26-32)  pg


 


MCHC   (32-36)  g/dl


 


RDW   (11.5-14.0)  %


 


Plt Count   (150-450)  K/mm3


 


MPV   (6-9.5)  fl


 


Gran %   (36.0-66.0)  %


 


Eos # (Auto)   (0-0.5)  


 


Absolute Lymphs (auto)   (1.0-4.6)  


 


Absolute Monos (auto)   (0.0-1.3)  


 


Lymphocytes %   (24.0-44.0)  %


 


Monocytes %   (0.0-12.0)  %


 


Eosinophils %   (0.00-5.0)  %


 


Basophils %   (0.0-0.4)  %


 


Absolute Granulocytes   (1.4-6.9)  


 


Basophils #   (0-0.4)  


 


Sodium   (137-145)  mmol/L


 


Potassium   (3.5-5.1)  mmol/L


 


Chloride   ()  mmol/L


 


Carbon Dioxide   (22-30)  mmol/L


 


Anion Gap   (5-15)  MEQ/L


 


BUN   (7-17)  mg/dL


 


Creatinine   (0.52-1.04)  mg/dL


 


Estimated GFR   ML/MIN


 


Glucose   ()  mg/dL


 


Calcium   (8.4-10.2)  mg/dL


 


Magnesium   (1.6-2.3)  mg/dL


 


Total Bilirubin   (0.2-1.3)  mg/dL


 


AST   (14-36)  U/L


 


ALT   (0-35)  U/L


 


Alkaline Phosphatase   ()  U/L


 


Serum Total Protein   (6.3-8.2)  g/dL


 


Albumin   (3.5-5.0)  g/dL


 


TSH 3rd Generation   (0.47-4.68)  mIU/L


 


Urine Color  YELLOW  (YELLOW)  


 


Urine Appearance  CLEAR  (CLEAR)  


 


Urine pH  7.0  (5-6)  


 


Ur Specific Gravity  1.010  (1.005-1.025)  


 


Urine Protein  NEGATIVE  (Negative)  


 


Urine Ketones  TRACE  (NEGATIVE)  


 


Urine Blood  MODERATE  (0-5)  Imchael/ul


 


Urine Nitrite  NEGATIVE  (NEGATIVE)  


 


Urine Bilirubin  NEGATIVE  (NEGATIVE)  


 


Urine Urobilinogen  NEGATIVE  (0-1)  mg/dL


 


Ur Leukocyte Esterase  NEGATIVE  (NEGATIVE)  


 


Urine WBC (Auto)  0-2  (0-5)  /HPF


 


Urine RBC (Auto)  16-25  (0-2)  /HPF


 


U Epithel Cells (Auto)  NONE  (FEW)  /HPF


 


Urine Bacteria (Auto)  NONE  (NEGATIVE)  /HPF


 


Urine Culture Reflexed  NO  (NO)  


 


Urine Glucose  NEGATIVE  (NEGATIVE)  mg/dL














- Progress


Progress: unchanged


Progress Note: 





19 21:20


Pt had labs done and UA.  All were normal, and pt did not have any UTI.  Pt has 

extensive therapy with Trazodone, Clonazepam, Tramadol, and Zoloft.  All will 

cause depression of mentation, and falls.  Pt should f/u with her PCP and have 

her meds slowly tapered down.


Discussed with : Fidencio


Will see patient in: office


Counseled pt/family regarding: need for follow-up





- Departure


Departure Disposition: Home


Clinical Impression: 


 Drug interaction





Condition: Stable


Critical Care Time: No


Referrals: 


MARSIOL BLACKWOOD [Primary Care Provider] - 


Additional Instructions: 


F/U with PCP, to adjust meds that can cause alteration in mental status, and 

instability.